# Patient Record
Sex: MALE | Race: BLACK OR AFRICAN AMERICAN
[De-identification: names, ages, dates, MRNs, and addresses within clinical notes are randomized per-mention and may not be internally consistent; named-entity substitution may affect disease eponyms.]

---

## 2020-11-23 ENCOUNTER — HOSPITAL ENCOUNTER (INPATIENT)
Dept: HOSPITAL 35 - ER | Age: 70
LOS: 10 days | Discharge: NURSING, REHAB OR LONG-TERM CARE | DRG: 871 | End: 2020-12-03
Attending: HOSPITALIST | Admitting: HOSPITALIST
Payer: COMMERCIAL

## 2020-11-23 VITALS — DIASTOLIC BLOOD PRESSURE: 47 MMHG | SYSTOLIC BLOOD PRESSURE: 103 MMHG

## 2020-11-23 VITALS — DIASTOLIC BLOOD PRESSURE: 47 MMHG | SYSTOLIC BLOOD PRESSURE: 73 MMHG

## 2020-11-23 VITALS — DIASTOLIC BLOOD PRESSURE: 51 MMHG | SYSTOLIC BLOOD PRESSURE: 117 MMHG

## 2020-11-23 VITALS — DIASTOLIC BLOOD PRESSURE: 37 MMHG | SYSTOLIC BLOOD PRESSURE: 122 MMHG

## 2020-11-23 VITALS — SYSTOLIC BLOOD PRESSURE: 98 MMHG | DIASTOLIC BLOOD PRESSURE: 44 MMHG

## 2020-11-23 VITALS — SYSTOLIC BLOOD PRESSURE: 109 MMHG | DIASTOLIC BLOOD PRESSURE: 49 MMHG

## 2020-11-23 VITALS — DIASTOLIC BLOOD PRESSURE: 46 MMHG | SYSTOLIC BLOOD PRESSURE: 67 MMHG

## 2020-11-23 VITALS — SYSTOLIC BLOOD PRESSURE: 104 MMHG | DIASTOLIC BLOOD PRESSURE: 48 MMHG

## 2020-11-23 VITALS — DIASTOLIC BLOOD PRESSURE: 46 MMHG | SYSTOLIC BLOOD PRESSURE: 121 MMHG

## 2020-11-23 VITALS — SYSTOLIC BLOOD PRESSURE: 74 MMHG | DIASTOLIC BLOOD PRESSURE: 45 MMHG

## 2020-11-23 VITALS — HEIGHT: 67.01 IN | WEIGHT: 129.31 LBS | BODY MASS INDEX: 20.29 KG/M2

## 2020-11-23 VITALS — SYSTOLIC BLOOD PRESSURE: 103 MMHG | DIASTOLIC BLOOD PRESSURE: 59 MMHG

## 2020-11-23 DIAGNOSIS — E43: ICD-10-CM

## 2020-11-23 DIAGNOSIS — G93.41: ICD-10-CM

## 2020-11-23 DIAGNOSIS — F03.90: ICD-10-CM

## 2020-11-23 DIAGNOSIS — A41.9: Primary | ICD-10-CM

## 2020-11-23 DIAGNOSIS — F32.9: ICD-10-CM

## 2020-11-23 DIAGNOSIS — K59.00: ICD-10-CM

## 2020-11-23 DIAGNOSIS — I12.9: ICD-10-CM

## 2020-11-23 DIAGNOSIS — Z20.828: ICD-10-CM

## 2020-11-23 DIAGNOSIS — R41.0: ICD-10-CM

## 2020-11-23 DIAGNOSIS — E78.5: ICD-10-CM

## 2020-11-23 DIAGNOSIS — R13.10: ICD-10-CM

## 2020-11-23 DIAGNOSIS — I69.359: ICD-10-CM

## 2020-11-23 DIAGNOSIS — R65.21: ICD-10-CM

## 2020-11-23 DIAGNOSIS — K92.0: ICD-10-CM

## 2020-11-23 DIAGNOSIS — K22.11: ICD-10-CM

## 2020-11-23 DIAGNOSIS — Z66: ICD-10-CM

## 2020-11-23 DIAGNOSIS — K25.4: ICD-10-CM

## 2020-11-23 DIAGNOSIS — E11.22: ICD-10-CM

## 2020-11-23 DIAGNOSIS — D50.9: ICD-10-CM

## 2020-11-23 DIAGNOSIS — N17.0: ICD-10-CM

## 2020-11-23 DIAGNOSIS — E87.6: ICD-10-CM

## 2020-11-23 DIAGNOSIS — I95.9: ICD-10-CM

## 2020-11-23 DIAGNOSIS — N18.30: ICD-10-CM

## 2020-11-23 DIAGNOSIS — K44.9: ICD-10-CM

## 2020-11-23 DIAGNOSIS — Z79.899: ICD-10-CM

## 2020-11-23 DIAGNOSIS — E86.0: ICD-10-CM

## 2020-11-23 DIAGNOSIS — Z88.8: ICD-10-CM

## 2020-11-23 DIAGNOSIS — E83.42: ICD-10-CM

## 2020-11-23 LAB
ALBUMIN SERPL-MCNC: 4 G/DL (ref 3.4–5)
ALT SERPL-CCNC: 15 U/L (ref 30–65)
ANION GAP SERPL CALC-SCNC: 36 MMOL/L (ref 7–16)
ANION GAP SERPL CALC-SCNC: 40 MMOL/L (ref 7–16)
AST SERPL-CCNC: 8 U/L (ref 15–37)
BACTERIA-REFLEX: (no result) /HPF
BILIRUB SERPL-MCNC: 0.3 MG/DL (ref 0.2–1)
BILIRUB UR-MCNC: NEGATIVE MG/DL
BUN SERPL-MCNC: 85 MG/DL (ref 7–18)
BUN SERPL-MCNC: 85 MG/DL (ref 7–18)
CALCIUM SERPL-MCNC: 8 MG/DL (ref 8.5–10.1)
CALCIUM SERPL-MCNC: 9.2 MG/DL (ref 8.5–10.1)
CHLORIDE SERPL-SCNC: 103 MMOL/L (ref 98–107)
CHLORIDE SERPL-SCNC: 99 MMOL/L (ref 98–107)
CO2 SERPL-SCNC: 6 MMOL/L (ref 21–32)
CO2 SERPL-SCNC: 7 MMOL/L (ref 21–32)
COLOR UR: YELLOW
CREAT SERPL-MCNC: 10.7 MG/DL (ref 0.7–1.3)
CREAT SERPL-MCNC: 10.8 MG/DL (ref 0.7–1.3)
ERYTHROCYTE [DISTWIDTH] IN BLOOD BY AUTOMATED COUNT: 15.2 % (ref 10.5–14.5)
GLUCOSE SERPL-MCNC: 113 MG/DL (ref 74–106)
GLUCOSE SERPL-MCNC: 142 MG/DL (ref 74–106)
GRANULOCYTES NFR BLD MANUAL: 85 % (ref 36–66)
HCT VFR BLD CALC: 32.8 % (ref 42–52)
HCT VFR BLD CALC: 42.2 % (ref 42–52)
HGB BLD-MCNC: 12.5 GM/DL (ref 14–18)
HGB BLD-MCNC: 9.7 GM/DL (ref 14–18)
KETONES UR STRIP-MCNC: (no result) MG/DL
LIPASE: 222 U/L (ref 73–393)
LYMPHOCYTES NFR BLD AUTO: 5 % (ref 24–44)
MAGNESIUM SERPL-MCNC: 1.9 MG/DL (ref 1.8–2.4)
MCH RBC QN AUTO: 23.2 PG (ref 26–34)
MCHC RBC AUTO-ENTMCNC: 29.5 G/DL (ref 28–37)
MCV RBC: 78.5 FL (ref 80–100)
MONOCYTES NFR BLD: 6 % (ref 1–8)
NEUTROPHILS # BLD: 11.9 THOU/UL (ref 1.4–8.2)
NEUTS BAND NFR BLD: 4 % (ref 0–8)
PLATELET # BLD: 254 THOU/UL (ref 150–400)
POTASSIUM SERPL-SCNC: 5.8 MMOL/L (ref 3.5–5.1)
POTASSIUM SERPL-SCNC: 5.9 MMOL/L (ref 3.5–5.1)
PROT SERPL-MCNC: 7.7 G/DL (ref 6.4–8.2)
RBC # BLD AUTO: 5.38 MIL/UL (ref 4.5–6)
RBC # UR STRIP: (no result) /UL
SODIUM SERPL-SCNC: 145 MMOL/L (ref 136–145)
SODIUM SERPL-SCNC: 146 MMOL/L (ref 136–145)
SP GR UR STRIP: >= 1.03 (ref 1–1.03)
SQUAMOUS: (no result) /LPF (ref 0–3)
TROPONIN I SERPL-MCNC: <0.06 NG/ML (ref ?–0.06)
URINE CLARITY: CLEAR
URINE GLUCOSE-RANDOM*: NEGATIVE
URINE LEUKOCYTES-REFLEX: (no result)
URINE NITRITE-REFLEX: NEGATIVE
URINE PROTEIN (DIPSTICK): (no result)
URINE WBC-REFLEX: (no result) /HPF (ref 0–5)
UROBILINOGEN UR STRIP-ACNC: 0.2 E.U./DL (ref 0.2–1)
WBC # BLD AUTO: 13.4 THOU/UL (ref 4–11)

## 2020-11-23 PROCEDURE — 62110: CPT

## 2020-11-23 PROCEDURE — 10081 I&D PILONIDAL CYST COMP: CPT

## 2020-11-23 PROCEDURE — 10102: CPT

## 2020-11-23 PROCEDURE — 10078: CPT

## 2020-11-23 PROCEDURE — 62900: CPT

## 2020-11-23 PROCEDURE — 70005: CPT

## 2020-11-23 NOTE — NUR
LAB CALLED TO DRAW BLOOD AS UNABLE TO OBTAIN FROM IV SITE. LAB STATES
THEY ARE UNABLE TO COME AT THIS TIME BUT WILL COME TO DRAW BLOOD.

## 2020-11-23 NOTE — HC
[facility]
[address]
[place]   [address]                     CONSULTATION                  
_______________________________________________________________________________
 
Name:       EMILIE PARKER                   Room #:         212-P       ADM IN  
M.R.#:      [id number]                       Account #:      [id number]  
Admission:  11/23/20    Attend Phys:    Prabhu Benedict MD     
Discharge:              Date of Birth:  [birth year]  
                                                          Report #: [id number]
                                                                    [id number]   
_______________________________________________________________________________
THIS REPORT FOR:  
 
cc:  Sunitha Chou MD, Ramilo MD Al-Absi,Jo CID MD                                          ~
 
 
REASON FOR CONSULTATION:  Acute kidney injury.
 
REASON FOR PRESENTATION:  Brought from his nursing facility because of melena,
vomiting and possible hematemesis.
 
HISTORY OF PRESENT ILLNESS:  I am not able to obtain any meaningful history from
the patient.  He has acute mental status issues.  He is a poor historian.  He is
known to have CVA with hemiparesis.  We do not have any baseline on his chronic
kidney disease issues.  He has a history of dementia, diabetes mellitus, and
hyperlipidemia.  He was brought because of vomiting and possible hematemesis and
was found to be on a profound acute kidney injury with a creatinine value of
10.7 and severe metabolic acidosis with the gap of around 40.  The patient is
not currently able to provide me with any details; however, I talked with his
brother about his issues.  He was admitted to the ICU for further management of
his ongoing health issues.
 
MEDICATIONS:  I am unable to obtain any of his nursing home medication; however,
it is stated that the patient is diabetic and is taking metformin.
 
PAST MEDICAL HISTORY:  Listed in the medical chart:
1.  CVA with hemiplegia and hemiparesis.
2.  Dementia.
3.  Diabetes mellitus.
4.  Hyperlipidemia.
5.  Hypertension.
6.  Dysarthria.
7.  Dysphagia.
 
ALLERGIES:  METOPROLOL AND NICARDIPINE.
 
REVIEW OF SYSTEMS:  I am not able to obtain given the patient's current mental
status.
 
FAMILY HISTORY:  I am not able to obtain given the patient's current mental
status.
 
SOCIAL HISTORY:  Unable to obtain, but he came from a nursing facility.
 
PHYSICAL EXAMINATION:
 
 
 
[facility]
[address]
[place]   [address]                     CONSULTATION                  
_______________________________________________________________________________
 
Name:       EMILIE PARKER                   Room #:         212-P       [facility] [place]  
[facility].#:      [id number]                       Account #:      [id number]  
Admission:  11/23/20    Attend Phys:    Prabhu Benedict MD     
Discharge:              Date of Birth:  [birth year]  
                                                          Report #: [id number]
                                                                    [id number]   
_______________________________________________________________________________
 
 
VITAL SIGNS:  Temperature 37.4, blood pressure is 120/85, pulse rate is 102.
HEAD AND NECK:  Extremely dry mucous membrane.
CHEST:  No crackles.
CARDIOVASCULAR:  No rub detected.
ABDOMEN:  Soft.
EXTREMITIES:  Lower extremities, no edema.
 
LABORATORY VALUES:  From admission revealed a white blood cell count of 13.4, pH
of 7.185.  Sodium of 144, potassium of 3.3, BUN of 71, creatinine of 8.0.
 
CT revealed a potential thickening of the left lateral and anterior wall of the
rectum.
 
Renal ultrasound revealed no evidence of hydronephrosis.
 
ASSESSMENT
1.  Acute kidney injury.
2.  Severe metabolic acidosis.
3.  Diabetes mellitus.
4.  Hypertension.
5.  Dementia.
 
PLAN:  I had a lengthy discussion with the patient's brother yesterday.  No
dialysis is warranted by the family members.  His metabolic acidosis is likely
related to multifactorial issues including metformin.  This has been improving. 
Continue with IV fluid for now.  His creatinine is dropping down.  Replace
potassium.  Continue to watch electrolytes, urine output.  Continue to avoid
nephrotoxins.  Continue with the current IV fluid; however, we will adjust in
the next few days to avoid electrolyte derangement.
 
 
 
 
 
 
 
 
 
 
 
 
 
                         
   By:                               
                   
D: 11/25/20 0744                           _____________________________________
T: 11/25/20 1111                           Jo Jones MD            /nt

## 2020-11-23 NOTE — EKG
[facility]
[place]   [address]                     ELECTROCARDIOGRAM REPORT      
_______________________________________________________________________________
 
Name:       EMILIE ARNOLD                   Room #:                     PRE [facility]..#:      [id number]                       Account #:      [id number]  
Admission:              Attend Phys:                          
Discharge:              Date of Birth:  [birth year]  
                                                          Report #: [id number]
                                                                    [id number]
_______________________________________________________________________________
THIS REPORT FOR:  
 
cc:                                
                                   
     Raulito Hart MD [facility]                                         ~
THIS REPORT FOR:   //name//                          
 
                         [facility] ED
                                       
Test Date:    2020               Test Time:    11:47:01
Pat Name:     EMILIE ARNOLD              Department:   
Patient ID:   [id number]            Room:          
Gender:                               Technician:   [facility]
:          1950               Requested By: Amparo Foote
Order Number: [id number] MD:   Raulito Hart
                                 Measurements
Intervals                              Axis          
Rate:         103                      P:            84
NC:           153                      QRS:          80
QRSD:         97                       T:            
QT:           346                                    
QTc:          453                                    
                           Interpretive Statements
Sinus tachycardia
Probable left atrial enlargement
Borderline repolarization abnormality
No previous ECG available for comparison
Electronically Signed On 2020 12:24:51 CST by Raulito Hart
[email or web address]
 
 
 
 
 
 
 
 
 
 
 
 
 
 
 
  <ELECTRONICALLY SIGNED>
   By: Raulito Hart MD, FAC    
  20     1224
D: 20 1147                           _____________________________________
T: 20 1147                           Raulito Hart MD, FACC      /EPI

## 2020-11-23 NOTE — NUR
LAB HAS NOT COME TO DRAW BLOOD. ATTEMPTED PERIPHERAL DRAW WITH NO SUCCESS.
LAB CALLED AGAIN FOR REQUEST.

## 2020-11-23 NOTE — EMS
[facility]
[address] [place]   [address]                     EMS Patient Care Report       
_______________________________________________________________________________
 
Name:       EMILIE PARKER                   Room #:                     REG SY CADET#:      [id number]                       Account #:      [id number]  
Admission:  20    Attend Phys:                          
Discharge:              Date of Birth:  [birth year]  
                                                          Report #: [id number]
                                                                    [id number]
_______________________________________________________________________________
THIS REPORT FOR:   //name//                          
 
Report Transmitted: 2020 12:46
EMS Care Summary
[place], [place]/[facility]
Incident [id number] @ 2020 10:45
 
Incident Location
[address]
 
Patient
EMILIE ARNOLD
Male, 70 Years
 1950
 
Patient Address
[address]
[place] [address]
 
Patient History
Stroke/CVA,
 
 
Patient Medications
Unknown,
 
Chief Complaint
VOMITING
 
Disposition
Transported No Lights/[place]
 
Dispatch Reason
Hemorrhage/Laceration
 
Transported To
[facility]
 
Narrative
RESPONDED TO HEMORRHAGE AT NURSING HOME. UPON ARRIVAL PT FOUND SLEEPING IN BED. 
[facility] STAFF REPORT THEY ARE NEW TO FACILITY AND DO NOT KNOW HOW LONG THIS ISSUE 
HAS BEEN HAPPENING. PT IS ALERT AND ORIENTED, REPORTS ISSUE FOR 3 DAYS. PT HAS 
NO OTHER COMPLAINTS EXCEPT THE VOMITING. [facility] STAFF REPORTS THE VOMIT APPEARS TO 
HAVE TRACE AMOUNTS OF BLOOD AND APPEARS TO BE FECES. [facility] STAFF CANNOT GIVE INFO 
 
 
 
[facility]
[address] [place]   [address]                     EMS Patient Care Report       
_______________________________________________________________________________
 
Name:       EMILIE PARKER                   Room #:                     ERIC CADET#:      [id number]                       Account #:      [id number]  
Admission:  20    Attend Phys:                          
Discharge:              Date of Birth:  [birth year]  
                                                          Report #: [id number]
                                                                    [id number]
_______________________________________________________________________________
ON IF PT HAS NORMAL STOOLS. PT IS TEAM LIFTED TO COT AND VITALS OBTAINED. EN 
ROUTE PT VOMITS X1. VOMIT APPEARS TO BE DARK BROWN, NO BLOOD NOTED. PT 
TRANSPORTED TO [facility] WITH NO CHANGE IN CONDITION. PT TEAM LIFTED TO BED AND 
HANDRAILS UP. REPORT GIVEN TO NURSE. 
 
Initial Vitals
@11:02P: 109,R: 20,BP: 99/52,SpO2: 96,
@10:57P: 59,R: 18,BP: 102/62,Pain: 0/10,GCS: 15,SpO2: 94,Revised Trauma: 12,
 
Assessments
@10:53MENTAL:Person Oriented,Time Oriented,Event Oriented,Place 
Oriented,SKIN:HEENT:Head/Face: No Abnormalities,Eyes: No 
Abnormalities,Neck/Airway: No Abnormalities,LUNG SOUNDS:General: 
Vomiting,ABDOMEN:General: Vomiting,PELVIS//GI:Incontinence,EXTREMITIES:Left 
Arm: Weakness,Left Leg: Weakness,Right Arm: Weakness,Right Leg: 
Weakness,PULSE:NEURO:No Abnormalities,@11:02MENTAL:No Abnormalities,SKIN:No 
Abnormalities,HEENT:Head/Face: No Abnormalities,Eyes: No 
Abnormalities,Neck/Airway: No Abnormalities,LUNG SOUNDS:General: Vomiting,Left 
Upper: No Abnormalities,Right Upper: No Abnormalities,Left Lower: No 
Abnormalities,Right Lower: No Abnormalities,ABDOMEN:General: Vomiting,Left 
Upper: No Abnormalities,Right Upper: No Abnormalities,Left Lower: No 
Abnormalities,Right Lower: No 
Abnormalities,PELVIS//GI:Incontinence,EXTREMITIES:Left Arm: Weakness,Right 
Arm: Weakness,Left Leg: Weakness,Right Leg: Weakness,PULSE:NEURO:No 
Abnormalities, 
 
Impression
Vomiting
 
Procedures
@10:53ALS AssessmentResponse: UnchangedSucceeded
 
Timeline
10:43,Call Received
10:43,Dispatch Notified
10:45,Dispatched
10:46,En Route
10:48,On Scene
10:53,At Patient
10:53,ALS Assessment,Response: UnchangedSucceeded,
10:57,BP: 102/62 M,PULSE: 59,RR: 18 R,SPO2: 94 Ox,ETCO2:  ,BG: ,PAIN: 0,GCS: 15,
11:02,BP: 99/52 M,PULSE: 109,RR: 20 R,SPO2: 96 Ox,ETCO2:  ,BG: ,PAIN: ,GCS: ,
11:02,Depart Scene
11:04,At Destination
11:15,Call Closed
 
 
 
 
[facility]
[address]
[place]   [address]                     EMS Patient Care Report       
_______________________________________________________________________________
 
Name:       EMILIE PARKER                   [facility] #[facility]                     [id number]                       Account #:      [id number]  
Admission:  20    Attend Phys:                          
Discharge:              Date of Birth:  [birth year]  
                                                          Report #: [id number]
                                                                    [id number]
_______________________________________________________________________________
Disclaimer
v1.1     Copyright 2020 [employer]
This EMS Care Summary contains data elements from the applicable legal record 
(which may be displayed differently). It is designed to provide pertinent 
information for the following purposes: continuity of care, clinical quality, 
and state data reporting. The complete legal record is available to ED staff 
and administrators of the receiving hospital in [facility]'s Patient Tracker. All data 
is provided "as is."

## 2020-11-24 VITALS — DIASTOLIC BLOOD PRESSURE: 33 MMHG | SYSTOLIC BLOOD PRESSURE: 131 MMHG

## 2020-11-24 VITALS — SYSTOLIC BLOOD PRESSURE: 119 MMHG | DIASTOLIC BLOOD PRESSURE: 50 MMHG

## 2020-11-24 VITALS — DIASTOLIC BLOOD PRESSURE: 63 MMHG | SYSTOLIC BLOOD PRESSURE: 105 MMHG

## 2020-11-24 VITALS — DIASTOLIC BLOOD PRESSURE: 47 MMHG | SYSTOLIC BLOOD PRESSURE: 149 MMHG

## 2020-11-24 VITALS — SYSTOLIC BLOOD PRESSURE: 135 MMHG | DIASTOLIC BLOOD PRESSURE: 46 MMHG

## 2020-11-24 VITALS — SYSTOLIC BLOOD PRESSURE: 115 MMHG | DIASTOLIC BLOOD PRESSURE: 37 MMHG

## 2020-11-24 VITALS — SYSTOLIC BLOOD PRESSURE: 161 MMHG | DIASTOLIC BLOOD PRESSURE: 26 MMHG

## 2020-11-24 VITALS — SYSTOLIC BLOOD PRESSURE: 113 MMHG | DIASTOLIC BLOOD PRESSURE: 41 MMHG

## 2020-11-24 VITALS — DIASTOLIC BLOOD PRESSURE: 41 MMHG | SYSTOLIC BLOOD PRESSURE: 112 MMHG

## 2020-11-24 VITALS — DIASTOLIC BLOOD PRESSURE: 49 MMHG | SYSTOLIC BLOOD PRESSURE: 144 MMHG

## 2020-11-24 VITALS — DIASTOLIC BLOOD PRESSURE: 41 MMHG | SYSTOLIC BLOOD PRESSURE: 73 MMHG

## 2020-11-24 VITALS — SYSTOLIC BLOOD PRESSURE: 125 MMHG | DIASTOLIC BLOOD PRESSURE: 54 MMHG

## 2020-11-24 VITALS — SYSTOLIC BLOOD PRESSURE: 136 MMHG | DIASTOLIC BLOOD PRESSURE: 44 MMHG

## 2020-11-24 VITALS — SYSTOLIC BLOOD PRESSURE: 124 MMHG | DIASTOLIC BLOOD PRESSURE: 50 MMHG

## 2020-11-24 VITALS — DIASTOLIC BLOOD PRESSURE: 39 MMHG | SYSTOLIC BLOOD PRESSURE: 132 MMHG

## 2020-11-24 VITALS — DIASTOLIC BLOOD PRESSURE: 37 MMHG | SYSTOLIC BLOOD PRESSURE: 78 MMHG

## 2020-11-24 VITALS — SYSTOLIC BLOOD PRESSURE: 83 MMHG | DIASTOLIC BLOOD PRESSURE: 49 MMHG

## 2020-11-24 VITALS — SYSTOLIC BLOOD PRESSURE: 132 MMHG | DIASTOLIC BLOOD PRESSURE: 37 MMHG

## 2020-11-24 VITALS — SYSTOLIC BLOOD PRESSURE: 126 MMHG | DIASTOLIC BLOOD PRESSURE: 42 MMHG

## 2020-11-24 VITALS — SYSTOLIC BLOOD PRESSURE: 105 MMHG | DIASTOLIC BLOOD PRESSURE: 45 MMHG

## 2020-11-24 VITALS — DIASTOLIC BLOOD PRESSURE: 36 MMHG | SYSTOLIC BLOOD PRESSURE: 135 MMHG

## 2020-11-24 VITALS — DIASTOLIC BLOOD PRESSURE: 104 MMHG | SYSTOLIC BLOOD PRESSURE: 126 MMHG

## 2020-11-24 VITALS — SYSTOLIC BLOOD PRESSURE: 129 MMHG | DIASTOLIC BLOOD PRESSURE: 56 MMHG

## 2020-11-24 VITALS — SYSTOLIC BLOOD PRESSURE: 106 MMHG | DIASTOLIC BLOOD PRESSURE: 40 MMHG

## 2020-11-24 VITALS — DIASTOLIC BLOOD PRESSURE: 41 MMHG | SYSTOLIC BLOOD PRESSURE: 110 MMHG

## 2020-11-24 VITALS — DIASTOLIC BLOOD PRESSURE: 34 MMHG | SYSTOLIC BLOOD PRESSURE: 115 MMHG

## 2020-11-24 VITALS — SYSTOLIC BLOOD PRESSURE: 145 MMHG | DIASTOLIC BLOOD PRESSURE: 55 MMHG

## 2020-11-24 VITALS — SYSTOLIC BLOOD PRESSURE: 93 MMHG | DIASTOLIC BLOOD PRESSURE: 47 MMHG

## 2020-11-24 VITALS — SYSTOLIC BLOOD PRESSURE: 133 MMHG | DIASTOLIC BLOOD PRESSURE: 41 MMHG

## 2020-11-24 VITALS — SYSTOLIC BLOOD PRESSURE: 127 MMHG | DIASTOLIC BLOOD PRESSURE: 38 MMHG

## 2020-11-24 VITALS — DIASTOLIC BLOOD PRESSURE: 31 MMHG | SYSTOLIC BLOOD PRESSURE: 97 MMHG

## 2020-11-24 VITALS — DIASTOLIC BLOOD PRESSURE: 33 MMHG | SYSTOLIC BLOOD PRESSURE: 122 MMHG

## 2020-11-24 VITALS — SYSTOLIC BLOOD PRESSURE: 129 MMHG | DIASTOLIC BLOOD PRESSURE: 39 MMHG

## 2020-11-24 VITALS — DIASTOLIC BLOOD PRESSURE: 69 MMHG | SYSTOLIC BLOOD PRESSURE: 104 MMHG

## 2020-11-24 VITALS — DIASTOLIC BLOOD PRESSURE: 44 MMHG | SYSTOLIC BLOOD PRESSURE: 136 MMHG

## 2020-11-24 VITALS — SYSTOLIC BLOOD PRESSURE: 126 MMHG | DIASTOLIC BLOOD PRESSURE: 34 MMHG

## 2020-11-24 VITALS — SYSTOLIC BLOOD PRESSURE: 84 MMHG | DIASTOLIC BLOOD PRESSURE: 57 MMHG

## 2020-11-24 VITALS — SYSTOLIC BLOOD PRESSURE: 151 MMHG | DIASTOLIC BLOOD PRESSURE: 43 MMHG

## 2020-11-24 VITALS — SYSTOLIC BLOOD PRESSURE: 138 MMHG | DIASTOLIC BLOOD PRESSURE: 51 MMHG

## 2020-11-24 VITALS — DIASTOLIC BLOOD PRESSURE: 47 MMHG | SYSTOLIC BLOOD PRESSURE: 134 MMHG

## 2020-11-24 VITALS — DIASTOLIC BLOOD PRESSURE: 59 MMHG | SYSTOLIC BLOOD PRESSURE: 154 MMHG

## 2020-11-24 VITALS — SYSTOLIC BLOOD PRESSURE: 87 MMHG | DIASTOLIC BLOOD PRESSURE: 43 MMHG

## 2020-11-24 VITALS — DIASTOLIC BLOOD PRESSURE: 45 MMHG | SYSTOLIC BLOOD PRESSURE: 90 MMHG

## 2020-11-24 VITALS — DIASTOLIC BLOOD PRESSURE: 36 MMHG | SYSTOLIC BLOOD PRESSURE: 145 MMHG

## 2020-11-24 VITALS — DIASTOLIC BLOOD PRESSURE: 45 MMHG | SYSTOLIC BLOOD PRESSURE: 117 MMHG

## 2020-11-24 VITALS — SYSTOLIC BLOOD PRESSURE: 127 MMHG | DIASTOLIC BLOOD PRESSURE: 44 MMHG

## 2020-11-24 VITALS — DIASTOLIC BLOOD PRESSURE: 36 MMHG | SYSTOLIC BLOOD PRESSURE: 111 MMHG

## 2020-11-24 VITALS — SYSTOLIC BLOOD PRESSURE: 139 MMHG | DIASTOLIC BLOOD PRESSURE: 37 MMHG

## 2020-11-24 VITALS — SYSTOLIC BLOOD PRESSURE: 129 MMHG | DIASTOLIC BLOOD PRESSURE: 41 MMHG

## 2020-11-24 VITALS — SYSTOLIC BLOOD PRESSURE: 110 MMHG | DIASTOLIC BLOOD PRESSURE: 44 MMHG

## 2020-11-24 VITALS — SYSTOLIC BLOOD PRESSURE: 138 MMHG | DIASTOLIC BLOOD PRESSURE: 30 MMHG

## 2020-11-24 VITALS — DIASTOLIC BLOOD PRESSURE: 59 MMHG | SYSTOLIC BLOOD PRESSURE: 103 MMHG

## 2020-11-24 VITALS — DIASTOLIC BLOOD PRESSURE: 36 MMHG | SYSTOLIC BLOOD PRESSURE: 132 MMHG

## 2020-11-24 VITALS — DIASTOLIC BLOOD PRESSURE: 47 MMHG | SYSTOLIC BLOOD PRESSURE: 117 MMHG

## 2020-11-24 VITALS — SYSTOLIC BLOOD PRESSURE: 93 MMHG | DIASTOLIC BLOOD PRESSURE: 48 MMHG

## 2020-11-24 VITALS — DIASTOLIC BLOOD PRESSURE: 44 MMHG | SYSTOLIC BLOOD PRESSURE: 123 MMHG

## 2020-11-24 VITALS — SYSTOLIC BLOOD PRESSURE: 120 MMHG | DIASTOLIC BLOOD PRESSURE: 63 MMHG

## 2020-11-24 VITALS — DIASTOLIC BLOOD PRESSURE: 33 MMHG | SYSTOLIC BLOOD PRESSURE: 114 MMHG

## 2020-11-24 VITALS — SYSTOLIC BLOOD PRESSURE: 140 MMHG | DIASTOLIC BLOOD PRESSURE: 35 MMHG

## 2020-11-24 VITALS — SYSTOLIC BLOOD PRESSURE: 138 MMHG | DIASTOLIC BLOOD PRESSURE: 56 MMHG

## 2020-11-24 VITALS — DIASTOLIC BLOOD PRESSURE: 53 MMHG | SYSTOLIC BLOOD PRESSURE: 89 MMHG

## 2020-11-24 VITALS — DIASTOLIC BLOOD PRESSURE: 57 MMHG | SYSTOLIC BLOOD PRESSURE: 116 MMHG

## 2020-11-24 VITALS — SYSTOLIC BLOOD PRESSURE: 105 MMHG | DIASTOLIC BLOOD PRESSURE: 32 MMHG

## 2020-11-24 VITALS — SYSTOLIC BLOOD PRESSURE: 112 MMHG | DIASTOLIC BLOOD PRESSURE: 41 MMHG

## 2020-11-24 VITALS — DIASTOLIC BLOOD PRESSURE: 47 MMHG | SYSTOLIC BLOOD PRESSURE: 127 MMHG

## 2020-11-24 VITALS — SYSTOLIC BLOOD PRESSURE: 138 MMHG | DIASTOLIC BLOOD PRESSURE: 38 MMHG

## 2020-11-24 VITALS — SYSTOLIC BLOOD PRESSURE: 112 MMHG | DIASTOLIC BLOOD PRESSURE: 49 MMHG

## 2020-11-24 VITALS — SYSTOLIC BLOOD PRESSURE: 113 MMHG | DIASTOLIC BLOOD PRESSURE: 49 MMHG

## 2020-11-24 VITALS — DIASTOLIC BLOOD PRESSURE: 46 MMHG | SYSTOLIC BLOOD PRESSURE: 106 MMHG

## 2020-11-24 VITALS — DIASTOLIC BLOOD PRESSURE: 47 MMHG | SYSTOLIC BLOOD PRESSURE: 74 MMHG

## 2020-11-24 VITALS — DIASTOLIC BLOOD PRESSURE: 49 MMHG | SYSTOLIC BLOOD PRESSURE: 118 MMHG

## 2020-11-24 VITALS — DIASTOLIC BLOOD PRESSURE: 48 MMHG | SYSTOLIC BLOOD PRESSURE: 126 MMHG

## 2020-11-24 VITALS — DIASTOLIC BLOOD PRESSURE: 29 MMHG | SYSTOLIC BLOOD PRESSURE: 135 MMHG

## 2020-11-24 VITALS — SYSTOLIC BLOOD PRESSURE: 106 MMHG | DIASTOLIC BLOOD PRESSURE: 86 MMHG

## 2020-11-24 VITALS — DIASTOLIC BLOOD PRESSURE: 43 MMHG | SYSTOLIC BLOOD PRESSURE: 132 MMHG

## 2020-11-24 VITALS — DIASTOLIC BLOOD PRESSURE: 38 MMHG | SYSTOLIC BLOOD PRESSURE: 110 MMHG

## 2020-11-24 VITALS — DIASTOLIC BLOOD PRESSURE: 63 MMHG | SYSTOLIC BLOOD PRESSURE: 135 MMHG

## 2020-11-24 VITALS — SYSTOLIC BLOOD PRESSURE: 96 MMHG | DIASTOLIC BLOOD PRESSURE: 50 MMHG

## 2020-11-24 VITALS — DIASTOLIC BLOOD PRESSURE: 40 MMHG | SYSTOLIC BLOOD PRESSURE: 141 MMHG

## 2020-11-24 VITALS — DIASTOLIC BLOOD PRESSURE: 103 MMHG | SYSTOLIC BLOOD PRESSURE: 135 MMHG

## 2020-11-24 VITALS — DIASTOLIC BLOOD PRESSURE: 53 MMHG | SYSTOLIC BLOOD PRESSURE: 116 MMHG

## 2020-11-24 VITALS — DIASTOLIC BLOOD PRESSURE: 35 MMHG | SYSTOLIC BLOOD PRESSURE: 170 MMHG

## 2020-11-24 VITALS — SYSTOLIC BLOOD PRESSURE: 101 MMHG | DIASTOLIC BLOOD PRESSURE: 43 MMHG

## 2020-11-24 VITALS — DIASTOLIC BLOOD PRESSURE: 50 MMHG | SYSTOLIC BLOOD PRESSURE: 123 MMHG

## 2020-11-24 VITALS — SYSTOLIC BLOOD PRESSURE: 127 MMHG | DIASTOLIC BLOOD PRESSURE: 43 MMHG

## 2020-11-24 VITALS — SYSTOLIC BLOOD PRESSURE: 157 MMHG | DIASTOLIC BLOOD PRESSURE: 31 MMHG

## 2020-11-24 VITALS — SYSTOLIC BLOOD PRESSURE: 118 MMHG | DIASTOLIC BLOOD PRESSURE: 36 MMHG

## 2020-11-24 VITALS — DIASTOLIC BLOOD PRESSURE: 51 MMHG | SYSTOLIC BLOOD PRESSURE: 135 MMHG

## 2020-11-24 VITALS — DIASTOLIC BLOOD PRESSURE: 40 MMHG | SYSTOLIC BLOOD PRESSURE: 122 MMHG

## 2020-11-24 LAB
ALBUMIN SERPL-MCNC: 2.9 G/DL (ref 3.4–5)
ANION GAP SERPL CALC-SCNC: 36 MMOL/L (ref 7–16)
BACTERIA #/AREA URNS HPF: (no result) /HPF
BASOPHILS NFR BLD AUTO: 0 % (ref 0–2)
BE(VIVO): -19.8 MMOL/L
BILIRUB UR-MCNC: NEGATIVE MG/DL
BUN SERPL-MCNC: 80 MG/DL (ref 7–18)
CALCIUM SERPL-MCNC: 7.4 MG/DL (ref 8.5–10.1)
CHLORIDE SERPL-SCNC: 105 MMOL/L (ref 98–107)
CO2 SERPL-SCNC: 8 MMOL/L (ref 21–32)
COLOR UR: YELLOW
CREAT SERPL-MCNC: 10.4 MG/DL (ref 0.7–1.3)
EOSINOPHIL NFR BLD: 0 % (ref 0–3)
ERYTHROCYTE [DISTWIDTH] IN BLOOD BY AUTOMATED COUNT: 15.5 % (ref 10.5–14.5)
GLUCOSE SERPL-MCNC: 238 MG/DL (ref 74–106)
GRANULOCYTES NFR BLD MANUAL: 84.1 % (ref 36–66)
HCO3 BLD-SCNC: 6.4 MMOL/L (ref 22–26)
HCT VFR BLD CALC: 34.4 % (ref 42–52)
HGB BLD-MCNC: 10.1 GM/DL (ref 14–18)
KETONES UR STRIP-MCNC: (no result) MG/DL
LYMPHOCYTES NFR BLD AUTO: 5.1 % (ref 24–44)
MAGNESIUM SERPL-MCNC: 1.6 MG/DL (ref 1.8–2.4)
MCH RBC QN AUTO: 23 PG (ref 26–34)
MCHC RBC AUTO-ENTMCNC: 29.2 G/DL (ref 28–37)
MCV RBC: 78.7 FL (ref 80–100)
MONOCYTES NFR BLD: 10.8 % (ref 1–8)
NEUTROPHILS # BLD: 9.1 THOU/UL (ref 1.4–8.2)
NITRITE UR QL STRIP: NEGATIVE
PCO2 BLD: 17.4 MMHG (ref 35–45)
PHOSPHATE SERPL-MCNC: 7.4 MG/DL (ref 2.5–4.9)
PLATELET # BLD: 216 THOU/UL (ref 150–400)
PO2 BLD: 109.5 MMHG (ref 80–100)
POTASSIUM SERPL-SCNC: 4.5 MMOL/L (ref 3.5–5.1)
RBC # BLD AUTO: 4.37 MIL/UL (ref 4.5–6)
RBC # UR STRIP: (no result) /UL
SODIUM SERPL-SCNC: 149 MMOL/L (ref 136–145)
SP GR UR STRIP: 1.02 (ref 1–1.03)
SQUAMOUS: (no result) /LPF (ref 0–3)
URINE CLARITY: (no result)
URINE CREATININE-RANDOM*: 78.5 MG/DL
URINE GLUCOSE-RANDOM*: NEGATIVE
URINE LEUKOCYTES: (no result)
URINE PROTEIN (DIPSTICK): (no result)
URINE PROTEIN-RANDOM*: 195.2 MG/DL (ref ?–11.9)
URINE SODIUM-RANDOM*: 101 MMOL/L
UROBILINOGEN UR STRIP-ACNC: 0.2 E.U./DL (ref 0.2–1)
WBC # BLD AUTO: 10.9 THOU/UL (ref 4–11)
WBC #/AREA URNS HPF: (no result) /HPF (ref 0–5)

## 2020-11-24 NOTE — NUR
chart review. unable to visit with storm. noted in chart he from [facility]. cm called number listed for brother betsy is wrong number. cm
called meka and unable to speak with nurse.

## 2020-11-24 NOTE — NUR
PATIENT CAME UP TO THE FLOOR AT AROUND 2230. THE ER NURSE AND I CLEANED THE
PATIENT UP AND GOT HIM INTO BED. PATIENT IS CONFUSED BUT CAN ANSWER SIMPLE
QUESTIONS. PATIENT WAS HYPOTENSIVE AND I CONTACTED ONUR LONDON. I GAVE THE
PATIENT A LITER OF NS. PATIENT'S BP CAME UP, BUT BECAME TACHYPNIC. CONTACTED
ONUR LONDON AGAIN AND RECIEVED ORDERS FOR AN AMP OF BICARB AND AN ABG. BICARB
GIVEN AND PATIENT'S RESPIRATIONS RETURNED TO NORMAL. ABG SHOWED CRIT LOW
BICARB AND PH. PATIENT'S LACTATE CONTINUES TO CLIMB. ANOTHER LACTATE WAS DRAWN
AT 0320. LAB HAS BEEN CONTACTED 3 DIFFERENT
TIMES ASKING ABOUT THE PATIENT'S LACTATE
LEVELS AT THIS TIME.

## 2020-11-24 NOTE — NUR
0815 PT RESTLESS PULLING AT SUAREZ OBTAINED ORDERS FOR RESTRAINTS PER ESTUARDO. PT
CODE STATUS CHANGED THIS AM. 1000 SPOKE W/DR MARTE REGARDING PT PLAN OF CARE.
LACTIC ACID DRAWS NO LONGER NEEDED Q3 PER KARU. 1030 PT MADE NPO FOLLOWING
SWALLOW EVAL. TITRATED OFF LEVO AT 1330. AFEBRILE. NO BM. . PROGRESSING
IN PLAN OF CARE

## 2020-11-25 VITALS — SYSTOLIC BLOOD PRESSURE: 124 MMHG | DIASTOLIC BLOOD PRESSURE: 61 MMHG

## 2020-11-25 VITALS — SYSTOLIC BLOOD PRESSURE: 124 MMHG | DIASTOLIC BLOOD PRESSURE: 64 MMHG

## 2020-11-25 VITALS — SYSTOLIC BLOOD PRESSURE: 136 MMHG | DIASTOLIC BLOOD PRESSURE: 77 MMHG

## 2020-11-25 VITALS — DIASTOLIC BLOOD PRESSURE: 85 MMHG | SYSTOLIC BLOOD PRESSURE: 120 MMHG

## 2020-11-25 VITALS — DIASTOLIC BLOOD PRESSURE: 59 MMHG | SYSTOLIC BLOOD PRESSURE: 148 MMHG

## 2020-11-25 LAB
ANION GAP SERPL CALC-SCNC: 12 MMOL/L (ref 7–16)
BUN SERPL-MCNC: 71 MG/DL (ref 7–18)
CALCIUM SERPL-MCNC: 8 MG/DL (ref 8.5–10.1)
CHLORIDE SERPL-SCNC: 104 MMOL/L (ref 98–107)
CO2 SERPL-SCNC: 28 MMOL/L (ref 21–32)
CREAT SERPL-MCNC: 8 MG/DL (ref 0.7–1.3)
ERYTHROCYTE [DISTWIDTH] IN BLOOD BY AUTOMATED COUNT: 14.2 % (ref 10.5–14.5)
GLUCOSE SERPL-MCNC: 204 MG/DL (ref 74–106)
HCT VFR BLD CALC: 31.1 % (ref 42–52)
HGB BLD-MCNC: 9.7 GM/DL (ref 14–18)
MCH RBC QN AUTO: 23.2 PG (ref 26–34)
MCHC RBC AUTO-ENTMCNC: 31.3 G/DL (ref 28–37)
MCV RBC: 74.3 FL (ref 80–100)
PLATELET # BLD: 155 THOU/UL (ref 150–400)
POTASSIUM SERPL-SCNC: 3.3 MMOL/L (ref 3.5–5.1)
RBC # BLD AUTO: 4.19 MIL/UL (ref 4.5–6)
SODIUM SERPL-SCNC: 144 MMOL/L (ref 136–145)
WBC # BLD AUTO: 6.1 THOU/UL (ref 4–11)

## 2020-11-25 NOTE — NUR
assumed pt care at around 2200, pt is a transfer from icu, pt is awake, alert
and oriented to self and place, sr on the monitor, asessments as charted, vss,
denied having concerns, no acute distress noted, will pass on report

## 2020-11-25 NOTE — NUR
FAXED CLINICAL UPDATE TO [facility] RECEIVED CONFIRMATION AND WILL F/U
WITH LEE IN ADM ON FRIDAY. The patient is a 31y Male complaining of abdominal pain.

## 2020-11-25 NOTE — 2DMMODE
[facility]
[address]
[place]   [address]                   2 D/M-MODE ECHOCARDIOGRAM     
_______________________________________________________________________________
 
Name:       EMILIE PARKER                   Room #:         212-P       ADM IN  
M.R.#:      [id number]                       Account #:      [id number]  
Admission:  20    Attend Phys:    Prabhu Benedict MD     
Discharge:              Date of Birth:  [birth year]  
                                                          Report #: [id number]
                                                                    [id number]
_______________________________________________________________________________
THIS REPORT FOR:  
 
cc:  Sunitha Chou MD, Ramilo MD Santiago, Patrick MD [facility]                                         
                                                                       ~
 
--------------- APPROVED REPORT --------------
 
 
Study performed:  2020 11:47:26
 
EXAM: Comprehensive 2D, Doppler, and color-flow 
Echocardiogram 
Patient Location: Bedside   
Room #:  212     Status:  routine
 
      BSA:         1.68
HR: 95 bpm BP:          124/61 mmHg 
Rhythm: NSR     
 
Other Information 
Study Quality: Technically Difficult
Technically limited study due to  inability to position patient, 
uncooperative patient.
 
Indications
CVA/TIA 
Diabetes
 
Echo Enhancing Agent
Indication: Rule out Shunt
Agent(s) / Amount(s) Used: Agitated Saline 7 cc
 
2D Dimensions
 IVC:  16.00 mm
 
Aortic Valve
AoV Peak Javier.:  1.49 m/s 
AO Peak Gr.:  8.85 mmHg  LVOT Max P.69 mmHg
    LVOT Max V:  1.29 m/s
 
Mitral Valve
    E/A Ratio:  0.9
    MV Decel. Time:  203.32 ms
MV E Max Javier.:  0.64 m/s 
 
 
[facility]
[address]
[place]  [address]
Phone:  [phone]                    2 D/M-MODE ECHOCARDIOGRAM     
_______________________________________________________________________________
 
Name:            EMILIE PARKER                   Room #:        212-P       ADM IN
M.R.#:           [id number]          Account #:     [id number]  
Admission:       20         Attend Phys:   Prabhu Benedict MD 
Discharge:                  Date of Birth: [birth year]  
                         Report #:      [id number]
        [id number]
_______________________________________________________________________________
MV A Javier.:  0.75 m/s  
MV PHT:  58.96 ms  
IVRT:  106.11 ms  
 
Pulmonary Valve
PV Peak Javier.:  1.01 m/s PV Peak Gr.:  4.10 mmHg
 
Pulmonary Vein
P Vein S:    0.30 m/s P Vein A:  0.28 m/s
P Vein D:   0.20 m/s P Vein A Dur.:  92.3 msec
P Vein S/D Ratio:  1.50 
 
Tricuspid Valve
TR Peak Javier.:  3.34 m/s  
TR Peak Gr.:  44.53 mmHg 
    PA Pressure:  50.00 mmHg
 
Left Ventricle
The left ventricle is normal size. There is normal LV segmental wall 
motion. There is normal left ventricular wall thickness. Left 
ventricular systolic function is normal. The left ventricular 
ejection fraction is within the normal range. LVEF is 60-65%. Grade I 
- abnormal relaxation pattern.
 
Right Ventricle
The right ventricle is normal size. The right ventricular systolic 
function is normal.
 
Atria
The left atrium size is normal. Interatrial septum is intact without 
evidence of ASD or PFO. The right atrium size is normal.
 
Aortic Valve
The aortic valve is normal in structure. No aortic regurgitation is 
present. There is no aortic valvular stenosis.
 
Mitral Valve
The mitral valve is normal in structure. There is no mitral valve 
regurgitation noted. No evidence of mitral valve stenosis.
 
Tricuspid Valve
The tricuspid valve is normal in structure. There is mild tricuspid 
regurgitation. Estimated PAP 50 mmHg. There is moderate pulmonary 
hypertension.
 
Pulmonic Valve
 
 
[facility]
[address]
[place]  [address]
Phone:  [phone]                    2 D/M-MODE ECHOCARDIOGRAM     
_______________________________________________________________________________
 
Name:            EMILIE PARKER                   Room #:        212-P       [facility] [place]
..#:           [id number]          Account #:     [id number]  
Admission:       20         Attend Phys:   Prabhu Benedict MD 
Discharge:                  Date of Birth: [birth year]  
                         Report #:      [id number]
        [id number]
_______________________________________________________________________________
The pulmonary valve is normal in structure. There is no pulmonic 
valvular regurgitation.
 
Great Vessels
The aortic root is normal in size. IVC is normal in size and 
collapses >50% with inspiration.
 
Pericardium
There is no pericardial effusion.
 
<Conclusion>
Normal left ventricle size and wall thickness
Ejection fraction 60%
Normal right ventricular size and function
Normal aortic/mitral valve structure and function
Mild tricuspid valve insufficiency
Pulmonary artery systolic pressure estimated at 50 mmHg
No pericardial effusion
 
 
 
 
 
 
 
 
 
 
 
 
 
 
 
 
 
 
 
 
 
 
 
 
 
 
  <ELECTRONICALLY SIGNED>
   By: Raulito Hart MD, [facility]    
  20     1300
D: 20 1300                           _____________________________________
T: 20 [facility]                           Raulito Hart MD, FACC      /INF

## 2020-11-25 NOTE — NUR
Patient admits from [facility] where he resides in ltc. Sp with
sibling Alexander Seth. Discussed can visit with one designated visitor and
reviewed hours. Mr Seth unsure which sigling will be designated visitor. Sp
with admissions at [facility]. DC planner to update facility. Plan return to
[facility] once stable.

## 2020-11-26 VITALS — DIASTOLIC BLOOD PRESSURE: 58 MMHG | SYSTOLIC BLOOD PRESSURE: 147 MMHG

## 2020-11-26 VITALS — SYSTOLIC BLOOD PRESSURE: 118 MMHG | DIASTOLIC BLOOD PRESSURE: 53 MMHG

## 2020-11-26 VITALS — DIASTOLIC BLOOD PRESSURE: 58 MMHG | SYSTOLIC BLOOD PRESSURE: 98 MMHG

## 2020-11-26 VITALS — DIASTOLIC BLOOD PRESSURE: 58 MMHG | SYSTOLIC BLOOD PRESSURE: 111 MMHG

## 2020-11-26 VITALS — DIASTOLIC BLOOD PRESSURE: 61 MMHG | SYSTOLIC BLOOD PRESSURE: 117 MMHG

## 2020-11-26 LAB
ALBUMIN SERPL-MCNC: 2.4 G/DL (ref 3.4–5)
ALBUMIN SERPL-MCNC: 2.6 G/DL (ref 3.4–5)
ANION GAP SERPL CALC-SCNC: 10 MMOL/L (ref 7–16)
ANION GAP SERPL CALC-SCNC: 12 MMOL/L (ref 7–16)
BUN SERPL-MCNC: 42 MG/DL (ref 7–18)
BUN SERPL-MCNC: 54 MG/DL (ref 7–18)
CALCIUM SERPL-MCNC: 8 MG/DL (ref 8.5–10.1)
CALCIUM SERPL-MCNC: 8.3 MG/DL (ref 8.5–10.1)
CHLORIDE SERPL-SCNC: 104 MMOL/L (ref 98–107)
CHLORIDE SERPL-SCNC: 105 MMOL/L (ref 98–107)
CO2 SERPL-SCNC: 27 MMOL/L (ref 21–32)
CO2 SERPL-SCNC: 27 MMOL/L (ref 21–32)
CREAT SERPL-MCNC: 3.8 MG/DL (ref 0.7–1.3)
CREAT SERPL-MCNC: 5 MG/DL (ref 0.7–1.3)
GLUCOSE SERPL-MCNC: 156 MG/DL (ref 74–106)
GLUCOSE SERPL-MCNC: 196 MG/DL (ref 74–106)
PHOSPHATE SERPL-MCNC: 2.3 MG/DL (ref 2.5–4.9)
PHOSPHATE SERPL-MCNC: 2.5 MG/DL (ref 2.5–4.9)
POTASSIUM SERPL-SCNC: 2.6 MMOL/L (ref 3.5–5.1)
POTASSIUM SERPL-SCNC: 2.8 MMOL/L (ref 3.5–5.1)
SODIUM SERPL-SCNC: 142 MMOL/L (ref 136–145)
SODIUM SERPL-SCNC: 143 MMOL/L (ref 136–145)

## 2020-11-26 NOTE — NUR
08:00 PT IS RESPONDING MORE APPROPRIATLEY THIS AM. WILL TRY A RESTRAINT
RELEASE AND SEE HOW HE DOES WITH SUCH BEFORE OBTIANING ANOTHER NEW ORDER FOR
SUCH. TURNED TO RIGHT SIDE COCCYX IS SLIGHTLY RED THEREFORE RELIEVED AS MUCH
PRESSURE FROM AREA AS POSSIBLE. PARTIAL BED BATH PERFORMED, HAIR WASHED AND
LIPS SCRUBBED WELL FOR DRY SKIN REMOVAL. OINTMENT TO LIPS FOR SUCH. NSR NO
ECTOPY, DENIES ANY SOB BUT REQUESTING WATER, EXPLAINED TO HIM HE COULD NOT
DRINK WATER YET AS HE IS NOT SWALLOWING SAFELY.

## 2020-11-26 NOTE — NUR
PATIENT ON BILATERAL MITTENS.REPOSITIONED Q2 HOURS AND AS NEEDED.MONITOR SHOWS
SR.NPO.DENIES NEEDS AT THIS TIME.POC CONTINUED.

## 2020-11-26 NOTE — NUR
12:00 REPOITIONED PT. IV IS INFUSING TO RIGHT SUBCLAVIAN, BLOOD RETURN FROM
ALL THREE PORTS AND FLUSHED HEAVILY. NSR NO ECTOPY, NO SIGN'S OF SOB. SUAREZ
CLEAR YELLOW URINE, NO SEDIMENT. RENAL WILL BE MANAGING HIS ELECTROLYTE
REPLACEMTS MOVING FORWARD TODAY.

## 2020-11-27 VITALS — DIASTOLIC BLOOD PRESSURE: 64 MMHG | SYSTOLIC BLOOD PRESSURE: 153 MMHG

## 2020-11-27 VITALS — DIASTOLIC BLOOD PRESSURE: 48 MMHG | SYSTOLIC BLOOD PRESSURE: 104 MMHG

## 2020-11-27 VITALS — SYSTOLIC BLOOD PRESSURE: 120 MMHG | DIASTOLIC BLOOD PRESSURE: 68 MMHG

## 2020-11-27 VITALS — DIASTOLIC BLOOD PRESSURE: 54 MMHG | SYSTOLIC BLOOD PRESSURE: 89 MMHG

## 2020-11-27 VITALS — DIASTOLIC BLOOD PRESSURE: 56 MMHG | SYSTOLIC BLOOD PRESSURE: 143 MMHG

## 2020-11-27 LAB
ALBUMIN SERPL-MCNC: 2.4 G/DL (ref 3.4–5)
ANION GAP SERPL CALC-SCNC: 11 MMOL/L (ref 7–16)
BUN SERPL-MCNC: 33 MG/DL (ref 7–18)
CALCIUM SERPL-MCNC: 8 MG/DL (ref 8.5–10.1)
CHLORIDE SERPL-SCNC: 104 MMOL/L (ref 98–107)
CO2 SERPL-SCNC: 26 MMOL/L (ref 21–32)
CREAT SERPL-MCNC: 2.8 MG/DL (ref 0.7–1.3)
GLUCOSE SERPL-MCNC: 168 MG/DL (ref 74–106)
MAGNESIUM SERPL-MCNC: 1.2 MG/DL (ref 1.8–2.4)
PHOSPHATE SERPL-MCNC: 2.3 MG/DL (ref 2.5–4.9)
POTASSIUM SERPL-SCNC: 2.8 MMOL/L (ref 3.5–5.1)
SODIUM SERPL-SCNC: 141 MMOL/L (ref 136–145)

## 2020-11-27 NOTE — NUR
ASSUMED PT CARE AT THE CHANGE OF SHIFT, PT IS AWAKE, ALERT AND ORIENTED,
PLEASAMNT AND COOPERATIVE WITH CARE, PT IS OFF RESTRAINS, SR ON THE MONITOR,
ASSESSMENTS AS CHARTED, 2 BAGS OF POTASSIUM INFUSED, PT WANTS TO EAT, BUT NPO,
MIGHT NEED SPEECH EVAL, HAD A LARGE LOOSE STOOL, NO ACUTE DISTRESS NOTED, PLAN
IS TO MONITOR POTASSIUM, PROGRESSING WELL TOWARDS POC

## 2020-11-27 NOTE — NUR
ASSUMED CARE PT SHIFT CHANGE. ASSESSMETNS AS CHARTED.MEDS GIVEN PER MAR. PT
ALERT AND ORIENTRED X1-2. FORGETFUL AND CONFUSED AT TIMES. PT SEEN BY SPEECH
WITH DIET ORDER. TOLERATING FAIR. PT APPETITE LESS THAN ADEQUATE. SPEECH TO
HAVE VIDEO SWALLOW ON MONDAY. PT TURNED AS TOLERATED. POTASSIUM ANDMAG
REPLACED PER ORDERS. PT CURRENLTY RESTING IN BED IN NAP.WILL CONT TO MONITOR
AND FOLLOW POC. WILL PASS ON REPORT TO TARIQ RN.

## 2020-11-27 NOTE — NUR
Ro liason updated. No weekend dc anticipated. The pt has been placed on a
modified diet with supervision. Plans for video swallow Monday then likely
return to the nursing home. Case discussed with the care team.

## 2020-11-28 VITALS — DIASTOLIC BLOOD PRESSURE: 58 MMHG | SYSTOLIC BLOOD PRESSURE: 121 MMHG

## 2020-11-28 VITALS — DIASTOLIC BLOOD PRESSURE: 63 MMHG | SYSTOLIC BLOOD PRESSURE: 122 MMHG

## 2020-11-28 VITALS — SYSTOLIC BLOOD PRESSURE: 121 MMHG | DIASTOLIC BLOOD PRESSURE: 63 MMHG

## 2020-11-28 VITALS — SYSTOLIC BLOOD PRESSURE: 114 MMHG | DIASTOLIC BLOOD PRESSURE: 62 MMHG

## 2020-11-28 VITALS — SYSTOLIC BLOOD PRESSURE: 111 MMHG | DIASTOLIC BLOOD PRESSURE: 55 MMHG

## 2020-11-28 LAB
ANION GAP SERPL CALC-SCNC: 8 MMOL/L (ref 7–16)
BUN SERPL-MCNC: 20 MG/DL (ref 7–18)
CALCIUM SERPL-MCNC: 7.9 MG/DL (ref 8.5–10.1)
CHLORIDE SERPL-SCNC: 101 MMOL/L (ref 98–107)
CO2 SERPL-SCNC: 25 MMOL/L (ref 21–32)
CREAT SERPL-MCNC: 1.8 MG/DL (ref 0.7–1.3)
ERYTHROCYTE [DISTWIDTH] IN BLOOD BY AUTOMATED COUNT: 13.9 % (ref 10.5–14.5)
GLUCOSE SERPL-MCNC: 150 MG/DL (ref 74–106)
HCT VFR BLD CALC: 31.3 % (ref 42–52)
HGB BLD-MCNC: 9.4 GM/DL (ref 14–18)
MCH RBC QN AUTO: 22.8 PG (ref 26–34)
MCHC RBC AUTO-ENTMCNC: 30.1 G/DL (ref 28–37)
MCV RBC: 75.9 FL (ref 80–100)
PLATELET # BLD: 123 THOU/UL (ref 150–400)
POTASSIUM SERPL-SCNC: 2.9 MMOL/L (ref 3.5–5.1)
RBC # BLD AUTO: 4.12 MIL/UL (ref 4.5–6)
SODIUM SERPL-SCNC: 134 MMOL/L (ref 136–145)
WBC # BLD AUTO: 5.4 THOU/UL (ref 4–11)

## 2020-11-28 NOTE — NUR
1900 ASSUMED CARE OF PT, 1930 BASELINE ASSESSMENT COMPLETED, PT RESTING IN BED
AND ANSWERS SOME QUESTIONS WITH YES OR NO DENIES PAIN OR DISCOMFORT, POSITION
CHANGED, SCD'S AND FALL PRECAUTIONS IN PLACE

## 2020-11-28 NOTE — NUR
PATIENT SLEPT MOST OF THE NIGHT. PATIENT IS A&01-2, CONFUSED AT TIMES. FALL
PRECAUTIONS ARE IN PLACE. SR ON THE MONITOR. VSS. NO COMPLAINTS OF PAIN. NO
SIGNS OF SOA. CONTINUING TO ASSESS ACCORDING TO POC.

## 2020-11-28 NOTE — NUR
ORDER CLARIFICATION CALL BACK FROM
DR VALENCIA, HOLD POTASSIUM IV ORDER FOR NOW. WILL
REEVALUATE IN AM, READ BACK, PHARMACY NOTIFIED  BECCA NG RN

## 2020-11-28 NOTE — NUR
1930 notified KEYSHA GUERRERO OF POTASSIUM ORDERS FOR CLARIFICATION, WILL
DRAW LABS AS ORDERED THEN CALL NEPHROLOGY PRIOR TO GIVING ANY MORE POTASSIUM,
FOR ORDER CLARIFICATION.

## 2020-11-28 NOTE — NUR
Assumed care of pt. after transfer at 1340. Pt. is calm and cooperative.
Vitals stable, fall precautions in place.

## 2020-11-29 VITALS — DIASTOLIC BLOOD PRESSURE: 69 MMHG | SYSTOLIC BLOOD PRESSURE: 130 MMHG

## 2020-11-29 VITALS — SYSTOLIC BLOOD PRESSURE: 119 MMHG | DIASTOLIC BLOOD PRESSURE: 67 MMHG

## 2020-11-29 VITALS — SYSTOLIC BLOOD PRESSURE: 134 MMHG | DIASTOLIC BLOOD PRESSURE: 80 MMHG

## 2020-11-29 VITALS — DIASTOLIC BLOOD PRESSURE: 77 MMHG | SYSTOLIC BLOOD PRESSURE: 11 MMHG

## 2020-11-29 LAB
ALBUMIN SERPL-MCNC: 2.4 G/DL (ref 3.4–5)
ANION GAP SERPL CALC-SCNC: 9 MMOL/L (ref 7–16)
BUN SERPL-MCNC: 15 MG/DL (ref 7–18)
CALCIUM SERPL-MCNC: 8.2 MG/DL (ref 8.5–10.1)
CHLORIDE SERPL-SCNC: 104 MMOL/L (ref 98–107)
CO2 SERPL-SCNC: 23 MMOL/L (ref 21–32)
CREAT SERPL-MCNC: 1.4 MG/DL (ref 0.7–1.3)
GLUCOSE SERPL-MCNC: 131 MG/DL (ref 74–106)
MAGNESIUM SERPL-MCNC: 1.9 MG/DL (ref 1.8–2.4)
PHOSPHATE SERPL-MCNC: 1.7 MG/DL (ref 2.5–4.9)
POTASSIUM SERPL-SCNC: 3.4 MMOL/L (ref 3.5–5.1)
SODIUM SERPL-SCNC: 136 MMOL/L (ref 136–145)

## 2020-11-29 NOTE — NUR
pt has continued to be more alert and oriented during shift and is now
speaking in full sentences requesting something sweet
and holding own sugar free pudding while waiting in between
bites, oriented to person place and situation, disoriented to year.

## 2020-11-29 NOTE — NUR
ASSUMED PT CARE THIS AM. PT VSS, A&OX2. PT HAS NO COMPLAINTS OF PAIN. IV
PATENT, FLUIDS INFUSING. MEDS GIVEN PER EMAR WITHOUT COMPLAINT. PT BEING FED
BY STAFF FOLLOWING INSTRUCTIONS FROM SPEECH. HYDRATION ENCOURAGED WITH
THICKENED LIQUIDS. SUAREZ IN PLACE, PATENT. PT REFUSED BREAKFAST THIS AM.
ENCOURAGED TO EAT AT MEAL TIME. PT HAD A BM IN BED, COMPLETE BED CHANGE.

## 2020-11-30 VITALS — DIASTOLIC BLOOD PRESSURE: 74 MMHG | SYSTOLIC BLOOD PRESSURE: 175 MMHG

## 2020-11-30 VITALS — DIASTOLIC BLOOD PRESSURE: 75 MMHG | SYSTOLIC BLOOD PRESSURE: 139 MMHG

## 2020-11-30 VITALS — DIASTOLIC BLOOD PRESSURE: 75 MMHG | SYSTOLIC BLOOD PRESSURE: 143 MMHG

## 2020-11-30 VITALS — SYSTOLIC BLOOD PRESSURE: 138 MMHG | DIASTOLIC BLOOD PRESSURE: 80 MMHG

## 2020-11-30 LAB
ALBUMIN SERPL-MCNC: 2.5 G/DL (ref 3.4–5)
ANION GAP SERPL CALC-SCNC: 10 MMOL/L (ref 7–16)
BUN SERPL-MCNC: 12 MG/DL (ref 7–18)
CALCIUM SERPL-MCNC: 8.8 MG/DL (ref 8.5–10.1)
CHLORIDE SERPL-SCNC: 104 MMOL/L (ref 98–107)
CO2 SERPL-SCNC: 23 MMOL/L (ref 21–32)
CREAT SERPL-MCNC: 1.4 MG/DL (ref 0.7–1.3)
GLUCOSE SERPL-MCNC: 125 MG/DL (ref 74–106)
MAGNESIUM SERPL-MCNC: 1.4 MG/DL (ref 1.8–2.4)
PHOSPHATE SERPL-MCNC: 1.9 MG/DL (ref 2.5–4.9)
POTASSIUM SERPL-SCNC: 3.5 MMOL/L (ref 3.5–5.1)
SODIUM SERPL-SCNC: 137 MMOL/L (ref 136–145)

## 2020-11-30 NOTE — NUR
PT AOX2-3, TO PERSON, PLACE, AND INTERMITTENTLY TO SITUATION. PT NOTED TO BE
PLEASANTLY CONFUSED AND FORGETFUL, ABLE TO MAKE NEEDS KNOWN. PT DENIES PAIN
AND SOB WHILE ON ROOM AIR. PT WITHOUT PO INTAKE OF HONEY THICKENED FLUIDS AND
PUREED DIET THIS SHIFT. PT INCONTINENT OF BOWEL, DIARRHEA X1. SUAREZ PATENT. PT
RESTING IN BED THROUGHOUT SHIFT, FREQUENT REPOSITIONING ENCOURAGED, PT
REFUSING REPOSITIONING ASSISTANCE DUE TO REPORTS OF COMFORT WHILE ON LEFT
SIDE. REDNESS NOTED TO BOTTOM, ZGUARD APPLIED. ONCALL NP NOTIFIED, RECEIVED
ORDERS FOR LOW AIR LOSS MATTRESS. PT ENCOURAGED TO NOTIFY STAFF FOR ALL NEEDS,
CALL LIGHT WITHIN REACH, BED ALARM ON, BED IN LOWEST POSITION, FREQUENT
MONITORING WILL CONTINUE.

## 2020-11-30 NOTE — NUR
ON-GOING ASSESSMENT: CM REVIEWED CHART. CM FAXED UPDATED CLINICAL TO
[facility]. PT MAY NEED EGD/FLEX SIG. GI FOLLOWING AND WILL DISCUSS. CM
NOTIFIED BEDSIDE RN PT WILL NEED NEGATIVE COVID TEST PRIOR TO D/C BACK TO
FACILITY. CM WILL CONTINUE TO FOLLOW TO ASSIST AS NEEDED.

## 2020-11-30 NOTE — NUR
assumed care at 0700. pt is a&0 x2. pt is currently declining and denies all
medication when offer in the morning. pt is doing well with pt as he can side
and step. pillow is betweeen legs and abd is soft and flat. streeter is in place.
call light within reach. right jugular midline
is intact and shows no signs of redness or
swelling. pt denies pain or nausea or vomitting. will continue to monitor.
fall precaution.

## 2020-12-01 VITALS — DIASTOLIC BLOOD PRESSURE: 73 MMHG | SYSTOLIC BLOOD PRESSURE: 146 MMHG

## 2020-12-01 VITALS — SYSTOLIC BLOOD PRESSURE: 145 MMHG | DIASTOLIC BLOOD PRESSURE: 64 MMHG

## 2020-12-01 VITALS — DIASTOLIC BLOOD PRESSURE: 53 MMHG | SYSTOLIC BLOOD PRESSURE: 103 MMHG

## 2020-12-01 VITALS — DIASTOLIC BLOOD PRESSURE: 61 MMHG | SYSTOLIC BLOOD PRESSURE: 119 MMHG

## 2020-12-01 NOTE — NUR
ASSUMED CARE AT 0700. PT IS A&0 X2. PT IS ALERT TO SELF AND LOCATION. HE
DENIES ALL OF HIS MEDICATION. HE HAS A POOR DIET. VSS. PT NEEDS TOTAL CARE. PT
DENIES BREAKFAST. PT WILL BE NPO AT MIDNIGHT DUE TO GI PROCEDURE. PT HAS RIGHT
JUGULAR AND IT SHOWS NO SIGNS OF REDNESS OF SWELLING. PT HAS NO EDEMA. PT
DENIES ANY PAIN, SOA, N/V. PT HAS SUAREZ IN PLACE. PT IS INCONTINENT WITH BM.
PT IS ACHS BUT NOT GIVEN INSULIN DUE TO BLOOD GLUCOSE LEVEL.FALL PRECAUTION.
CALL LIGHT WITHIN REACH.

## 2020-12-01 NOTE — NUR
ASSUMED PT  CARE AT SHIFT CHANGE. PT REFUSED POLYETHYLENE GLYCOL. PT BLOOD
SUGAR  NO INSULIN INDICATED. PT DID TELL ME THAT HE DOES NOT WANT
ANYTHING AT ALL MEDICATION WISE BECAUSE HE DIDN'T KNOW WHAT IT WOULD DO FOR
HIM. PT WAS AGGITATED AT THE BEGINGING OF THE SHIFT BUT BECAME A LOT MORE
PLEASANT AS THE SHIFT PROGRESSED. PT DOES NOT LIKE THE NECTAR THICK FLUIDS BUT
I DID ENCOURAGE HIM TO DRINK PLENTY TO STAY HYDRATED. PT DENIES PAIN, N/V. PT
HAS A MIDLINE IN HIS RIGHT JUGULAR. PT DID NOT GET OUT OF BED THIS SHIFT. NO
SKIN ISSUES. OLD SCARS ON BOTTOM AND LEGS.  FREQUENT ROUNDS PERFORMED ON PT.
WILL CONTINUE TO MONITOR.

## 2020-12-01 NOTE — NUR
ASSUMED PT CARE AT SHIFT [facility]. PT HAS A SUAREZ IN PLACE. PT HAS TRIPLE LUMEN
RIGHT JUGULAR MIDLINE IN PLACE WITH FLUIDS RUNNING. PT OXYGEN IS AT A 91% I
TRIED TO ENCOURAGE PT TO WEAR 2L OXYGEN BUT HE REFUSED. GAVE REPORT TO KAIN
(PUSHPA) BEFORE GOING TO THE ER.

## 2020-12-02 VITALS — DIASTOLIC BLOOD PRESSURE: 67 MMHG | SYSTOLIC BLOOD PRESSURE: 151 MMHG

## 2020-12-02 VITALS — SYSTOLIC BLOOD PRESSURE: 142 MMHG | DIASTOLIC BLOOD PRESSURE: 59 MMHG

## 2020-12-02 VITALS — SYSTOLIC BLOOD PRESSURE: 158 MMHG | DIASTOLIC BLOOD PRESSURE: 69 MMHG

## 2020-12-02 LAB
ALBUMIN SERPL-MCNC: 2.6 G/DL (ref 3.4–5)
ALT SERPL-CCNC: 12 U/L (ref 30–65)
ANION GAP SERPL CALC-SCNC: 8 MMOL/L (ref 7–16)
AST SERPL-CCNC: 12 U/L (ref 15–37)
BILIRUB SERPL-MCNC: 0.2 MG/DL (ref 0.2–1)
BUN SERPL-MCNC: 7 MG/DL (ref 7–18)
CALCIUM SERPL-MCNC: 8.7 MG/DL (ref 8.5–10.1)
CHLORIDE SERPL-SCNC: 107 MMOL/L (ref 98–107)
CO2 SERPL-SCNC: 25 MMOL/L (ref 21–32)
CREAT SERPL-MCNC: 1.1 MG/DL (ref 0.7–1.3)
ERYTHROCYTE [DISTWIDTH] IN BLOOD BY AUTOMATED COUNT: 13.7 % (ref 10.5–14.5)
GLUCOSE SERPL-MCNC: 122 MG/DL (ref 74–106)
HCT VFR BLD CALC: 25.8 % (ref 42–52)
HGB BLD-MCNC: 8 GM/DL (ref 14–18)
IRON SERPL-MCNC: 39 UG/DL (ref 65–175)
MCH RBC QN AUTO: 23.4 PG (ref 26–34)
MCHC RBC AUTO-ENTMCNC: 30.9 G/DL (ref 28–37)
MCV RBC: 75.7 FL (ref 80–100)
OBSERVED RETIC COUNT: 0.38 % (ref 0.6–2.6)
PLATELET # BLD: 124 THOU/UL (ref 150–400)
POTASSIUM SERPL-SCNC: 3.3 MMOL/L (ref 3.5–5.1)
PROT SERPL-MCNC: 6.1 G/DL (ref 6.4–8.2)
RBC # BLD AUTO: 3.41 MIL/UL (ref 4.5–6)
SAO2 % BLD FROM PO2: 27 % (ref 20–39)
SODIUM SERPL-SCNC: 140 MMOL/L (ref 136–145)
TIBC SERPL-MCNC: 146 UG/DL (ref 250–450)
WBC # BLD AUTO: 4 THOU/UL (ref 4–11)

## 2020-12-02 NOTE — NUR
ON-GOING ASSESSMENT: CM REVIEWED CHART AND SPOKE WITH ATTENDING. PT GETTING
FLEX SIG TODAY. PT IS STILL OUT OF THE ROOM AT 1600 FOR PROCEDURE. CM SENT
UPDATED CLINICAL TO [facility] AND UPDATED YUNG IN ADMISSIONS. CM
WILL CONTINUE TO FOLLOW TO ASSIST AS NEEDED.

## 2020-12-02 NOTE — NUR
Assumed care of pt. at 0700. Pt. was calm and cooperative. Pt. was taken down
for EGD around 1400. Pt. returned from EGD and was pleasant and did not report
pain. Fall precautions in place.

## 2020-12-02 NOTE — NUR
CONTINUED CARE FROM OUTGOING RN. PT RESTING IN BED. NPO AT MIDNIGHT FOR EGD
TOMORROW. IV INTACT AND FLUIDS INFUSING. FOLLEY IN PLACE. FALL PREC MAINTAINED
AND WILL CONT WITH POC TILL EOS

## 2020-12-03 VITALS — SYSTOLIC BLOOD PRESSURE: 133 MMHG | DIASTOLIC BLOOD PRESSURE: 71 MMHG

## 2020-12-03 VITALS — SYSTOLIC BLOOD PRESSURE: 129 MMHG | DIASTOLIC BLOOD PRESSURE: 55 MMHG

## 2020-12-03 LAB
ANION GAP SERPL CALC-SCNC: 10 MMOL/L (ref 7–16)
BUN SERPL-MCNC: 7 MG/DL (ref 7–18)
CALCIUM SERPL-MCNC: 8.9 MG/DL (ref 8.5–10.1)
CHLORIDE SERPL-SCNC: 107 MMOL/L (ref 98–107)
CO2 SERPL-SCNC: 26 MMOL/L (ref 21–32)
CREAT SERPL-MCNC: 1.2 MG/DL (ref 0.7–1.3)
ERYTHROCYTE [DISTWIDTH] IN BLOOD BY AUTOMATED COUNT: 14 % (ref 10.5–14.5)
GLUCOSE SERPL-MCNC: 117 MG/DL (ref 74–106)
HCT VFR BLD CALC: 27.4 % (ref 42–52)
HGB BLD-MCNC: 8.4 GM/DL (ref 14–18)
MAGNESIUM SERPL-MCNC: 1.4 MG/DL (ref 1.8–2.4)
MCH RBC QN AUTO: 23.2 PG (ref 26–34)
MCHC RBC AUTO-ENTMCNC: 30.6 G/DL (ref 28–37)
MCV RBC: 76 FL (ref 80–100)
PLATELET # BLD: 145 THOU/UL (ref 150–400)
POTASSIUM SERPL-SCNC: 3.5 MMOL/L (ref 3.5–5.1)
RBC # BLD AUTO: 3.61 MIL/UL (ref 4.5–6)
SODIUM SERPL-SCNC: 143 MMOL/L (ref 136–145)
WBC # BLD AUTO: 4.4 THOU/UL (ref 4–11)

## 2020-12-03 NOTE — NUR
on-going assessment: CM REVIEWED CHART AND SPOKE WITH PTS BROTHER. PT IS
STABLE TO DISCHARGE TODAY TO [facility]. CM FAXED D/C ORDERS TO
FACILITY. CHART COPY WAS ORDERED AND UNIT SECRETARY NOTIFIED. BROTHER IS
AGREEABLE WITH DISCHARGE PLAN. TRANSPORTATION HAS BEEN ARRANGED FOR 1400. CM
NOTIFIED BEDSIDE RN WHO HAS THE NUMBER FOR REPORT. PT REPORTS NO FURTHER NEEDS
FROM CM .

## 2020-12-03 NOTE — NUR
ASSUMED CARE AT 0700. PT IS A&O X2. PT HAS A SUAREZ IN PLACE AND URINE IS LIGHT
YELLOW. PT DENIES ANY PAIN, N,V,D, SOA. PT BREATHING IS ADEQUATE IN THE 90'S
WITH RA. PT IS ABLE TO ROTATE AND TURN Q2. PT IS TOLERATING NECTAR THICK DIET
VERY WELL. PT IS STILL INCONTINENT WITH BOWEL MOVEMENT. WILL BE CHECK
FREQUENTLY. WOUND ON COCCYX AND Z GUARD WAS APPLIED ON COCCYX. THERE IS NO IV
AS THE PT REMOVED IV LAST NIGHT. DOCTOR SHIRA HAS OKAY PT WITH NO HAVING IV. PT
IS ACCU CHECK BUT THIS AM: PT BG IS WNL.FALL PRECAUTION. CALL LIGHT WITHIN
REACH. WILL CONTINUE TO MONITOR FOR BREATHING AND PAIN PRN..

## 2020-12-03 NOTE — NUR
PT CALLED FOR HELP.WHEN I GOT TO HIS ROOM, I NOTED THE CENTRAL LINE ON HIS BED
SIDE TABLE. NO ACTIVE BLEEDING NOTED TO NECK SITE. SOME SMALL AMOUNT OF BLOOD
NOTED ON GOWN. PT HAD NO CLUE WHAT I WAS TALKING ABOUT WHEN I ASKED WHY HE
PULLED THE LINE OUT.THE LINE WAS TRIMMED AT 25CM . PT DENIES PAIN. HE IS
CONVERSATIONAL. NO SOA NOTED.

## 2020-12-04 NOTE — P
[facility]
Cleve Agarwal
[place]   [address]                     PROCEDURE REPORT              
_______________________________________________________________________________
 
Name:       EMILIE PARKER                   Room #:         448-P       [facility] [place]  
M.R.#:      [id number]                       Account #:      [id number]  
Admission:  11/23/20    Attend Phys:    Prabhu Benedict MD     
Discharge:  12/03/20    Date of Birth:  [birth year]  
                                                          Report #: [id number]
                                                                    [id number]   
_______________________________________________________________________________
THIS REPORT FOR:  
 
cc:  Sunitha Chou MD, Ramilo MD McElhinney, Christian C. MD                                   ~
CC: Jo Chou
 
DATE OF SERVICE:  12/02/2020
 
 
PROCEDURE PERFORMED:  Flexible sigmoidoscopy. 
 
HISTORY OF PRESENT ILLNESS:  The patient is a 70-year-old male who was admitted
with nausea, vomiting, decreased bowel movements.  He has a history of dementia.
 Overall, is a poor historian, possible coffee-ground emesis.  The upper
endoscopy was just performed showing gastritis with esophagitis.  No evidence of
active bleeding; however, the patient has been on PPI therapy since admission. 
He underwent a CT scan of the abdomen and pelvis, which shows asymmetric
thickening in the left lateral and anterior wall of the rectum, moderate amount
of stool was noted.  Prostate enlargement and bladder wall thickening also
noted.  It is unclear when and if the patient has had a previous colonoscopy
because of his dementia, it was felt like he would not be able to tolerate a
prep, the plan is for flexible sigmoidoscopy. 
 
DESCRIPTION OF PROCEDURE:  The risks and benefits of the procedure were
explained to the patient's durable power of , those risks including but
not limited to bleeding, perforation and the risk of sedation.  He understood
these risks and gave informed consent.  Sedation was given using propofol per
anesthesia.  Next, a digital rectal exam was initially performed which was
normal.  I did not feel any mass lesions.  Next, the prostate feel enlarged, but
no obvious mass or nodule was noted.  Next, using a standard Olympus
colonoscope, the scope was placed in the patient's anus and advanced under
direct vision into the sigmoid colon.  The overall prep was very poor.  Despite
multiple washings and aspirations, I was not able to visualize the rectal
mucosa.  There were areas in the rectosigmoid and sigmoid colon that were able
to be visualized, which were normal.  No obvious colitis, polyps or masses were
seen, but again visualization was significantly limited.  Most of the rectum
could not be evaluated.  Again, the patient did not have a mass on digital
rectal exam or thickening felt on exam.  The scope was then withdrawn and the
procedure terminated.  The patient tolerated the procedure well. 
 
IMPRESSION: 
1.  Poor prep as described above. 
 
 
 
[address]   [address]                     PROCEDURE REPORT              
_______________________________________________________________________________
 
Name:       EMILIE PARKER                   Room #:         448-P       [facility] [place]  
M.R.#:      [id number]                       Account #:      [id number]  
Admission:  11/23/20    Attend Phys:    Prabhu Benedict MD     
Discharge:  12/03/20    Date of Birth:  [birth year]  
                                                          Report #: [id number]
                                                                    [id number]   
_______________________________________________________________________________
2.  No obvious mass palpated on digital rectal exam today.  No obvious mass
seen, but visualization was significantly limited. 
 
RECOMMENDATIONS:  Observe the patient post-procedure. 
 
Thank you for allowing me to participate in his care.
 
 
 
 
 
 
 
 
 
 
 
 
 
 
 
 
 
 
 
 
 
 
 
 
 
 
 
 
 
 
 
 
 
 
 
 
 
 
  <ELECTRONICALLY SIGNED>
   By: Mor Piña MD   
  12/04/20     1156
D: 12/02/20 1610                           _____________________________________
T: 12/03/20 0030                           Mor Piña MD     /nt

## 2020-12-04 NOTE — P
[facility]
Cleve Agarwal
[place]   [address]                     PROCEDURE REPORT              
_______________________________________________________________________________
 
Name:       EMILIE PARKER                   Room #:         448-P       [facility] [place]  
M.R.#:      [id number]                       Account #:      [id number]  
Admission:  11/23/20    Attend Phys:    Prabhu Benedict MD     
Discharge:  12/03/20    Date of Birth:  [birth year]  
                                                          Report #: [id number]
                                                                    [id number]   
_______________________________________________________________________________
THIS REPORT FOR:  
 
cc:  Sunitha Chou MD, Ramilo MD McElhinney, Christian C. MD                                   ~
CC: Jo Chou
 
DATE OF SERVICE:  12/02/2020
 
 
PROCEDURE PERFORMED:  Upper endoscopy with biopsies.
 
HISTORY OF PRESENT ILLNESS:  The patient is a 70-year-old male with possible
history of coffee-ground emesis.  He has been on PPI therapy since admission. 
Hemoglobin has remained stable.  A CT scan of the abdomen and pelvis was also
performed on 11/23/2020, which showed asymmetric thickening of the left lateral
and anterior wall of the rectum, may represent an infiltrating neoplasm or mass.
 Moderate amount of stool was present in the rectum.  Plan is for an EGD and
flexible sigmoidoscopy today.
 
DESCRIPTION OF PROCEDURE:  The risks and benefits of the procedure were
explained to the patient's DPOA, those risks including but not limited to
bleeding, perforation and the risk of sedation.  He understood these risks and
gave informed consent.  Sedation was given using propofol per anesthesia.  Next,
using a standard Olympus upper endoscope, the scope was placed in the patient's
mouth and advanced under direct vision through the esophagus, stomach and into
the second portion of the duodenum.  The upper and mid esophagus was normal in
appearance.  In the distal esophagus, grade B erosive esophagitis was noted.  No
evidence of active bleeding.  Upon entering the stomach, a small hiatal hernia
was noted.  There was a diffuse gastritis noted in the body and antrum of the
stomach with several linear erosions.  No evidence of bleeding.  Biopsies were
obtained to rule out H. pylori today.  The pylorus was normal and patent.  The
duodenal bulb, first and second portion were all normal.  Scope was then
withdrawn and the procedure terminated.  The patient tolerated the procedure
well.
 
IMPRESSION:
1.  Grade B erosive esophagitis.
2.  Gastritis with gastric erosions.
3.  Small hiatal hernia.
4.  Otherwise, normal upper endoscopy.
 
RECOMMENDATIONS:
 
 
 
[address]   [address]                     PROCEDURE REPORT              
_______________________________________________________________________________
 
Name:       EMILIE PARKER                   Room #:         448-P       [facility] [place]  
.R.#:      [id number]                       Account #:      [id number]  
Admission:  11/23/20    Attend Phys:    Prabhu Benedict MD     
Discharge:  12/03/20    Date of Birth:  [birth year]  
                                                          Report #: [id number]
                                                                    [id number]   
_______________________________________________________________________________
1.  Await biopsy results.
2.  Continue PPI therapy.
3.  We will proceed with flexible sigmoidoscopy next today.
 
Thank you for allowing me to participate in his care.
 
 
 
 
 
 
 
 
 
 
 
 
 
 
 
 
 
 
 
 
 
 
 
 
 
 
 
 
 
 
 
 
 
 
 
 
 
 
 
  <ELECTRONICALLY SIGNED>
   By: Mor Piña MD   
  12/04/20     1156
D: 12/02/20 1607                           _____________________________________
T: 12/03/20 0023                           Mor Piña, MD     /nt

## 2020-12-07 NOTE — PATH
[facility]
[address]
[place]   [address]                     PATHOLOGY RPT PROCEDURE       
_______________________________________________________________________________
 
Name:       ELENAEMILIE                   Room #:         448-P       [facility] [place]  
M.R.#:      [id number]     Account #:      [id number]  
Admission:  11/23/20    Date of Birth:  [birth year]  
Discharge:  12/03/20                                    Report #:    [id number]
                                                        Path Case #: [id number]
_______________________________________________________________________________
 
[facility] Accession Number: [id number]
.                                                                01
Material submitted:                                        .
stomach - BIOPSY OF GASTRITIS R/O H. PYLORI
.                                                                01
Clinician provided ICD-10:
K92.0
N17.0
.                                                                01
Clinical history:                                          .
COFFEE GROUND EMESIS; GASTRITIS, ABNORMAL CT, ESOPHAGITIS
.                                                                02
**********************************************************************
Diagnosis:
Gastric mucosa, gastritis, rule out H. pylori, endoscopic biopsy:
- Mild reactive gastropathy.
- Negative for intestinal metaplasia or atrophy.
- Negative for Helicobacter pylori (properly controlled
immunohistochemical stain performed).
(IUV:pit 12/04/2020)
QTP  12/04/2020  1525 Local
**********************************************************************
.                                                                02
Electronically signed:                                     .
Kathi Nascimento MD, Pathologist
NPI- [id number]
.                                                                01
Gross description:                                         .
The specimen is received in formalin, labeled "Emilie Seth BX of
gastritis" and consists of 4 fragments of pink-tan tissue measuring
between 0.3 x 0.2 cm and 0.5 x 0.3 cm which are entirely submitted in A1.
(SDY; 12/3/2020)
SYU/SYU  12/03/2020  1612 Local
.                                                                02
Pathologist provided ICD-10:
K31.9
.                                                                02
CPT                                                        .
284887, E81182
Specimen Comment: A courtesy copy of this report has been sent to [phone]
[phone]
Specimen Comment: [phone], [phone]
Specimen Comment: Report sent to ,DR PRECIADO / DR GUILLEN
***Performed at:  01
   [facility], [place]  [id number]
   MD Torres Osorio MD Phone:  [phone]
 
 
[place] [address]                     PATHOLOGY RPT PROCEDURE       
_______________________________________________________________________________
 
Name:       EMILIE SETH                   Room #:         448-P       [facility] [place]  
M.R.#:      [id number]     Account #:      [id number]  
Admission:  11/23/20    Date of Birth:  [birth year]  
Discharge:  12/03/20                                    Report #:    [id number]
                                                        Path Case #: [id number]
_______________________________________________________________________________
***Performed at:  02
   [facility]  [id number]
   MD Kathi Nascimento MD Phone:  [phone]

## 2022-01-03 ENCOUNTER — HOSPITAL ENCOUNTER (OUTPATIENT)
Dept: HOSPITAL 35 - CATH | Age: 72
Discharge: HOME | End: 2022-01-03
Attending: RADIOLOGY
Payer: COMMERCIAL

## 2022-01-03 VITALS — SYSTOLIC BLOOD PRESSURE: 148 MMHG | DIASTOLIC BLOOD PRESSURE: 76 MMHG

## 2022-01-03 VITALS — HEIGHT: 70 IN | BODY MASS INDEX: 17.47 KG/M2 | WEIGHT: 122 LBS

## 2022-01-03 VITALS — DIASTOLIC BLOOD PRESSURE: 80 MMHG | SYSTOLIC BLOOD PRESSURE: 144 MMHG

## 2022-01-03 VITALS — DIASTOLIC BLOOD PRESSURE: 85 MMHG | SYSTOLIC BLOOD PRESSURE: 156 MMHG

## 2022-01-03 VITALS — SYSTOLIC BLOOD PRESSURE: 161 MMHG | DIASTOLIC BLOOD PRESSURE: 91 MMHG

## 2022-01-03 VITALS — DIASTOLIC BLOOD PRESSURE: 85 MMHG | SYSTOLIC BLOOD PRESSURE: 165 MMHG

## 2022-01-03 VITALS — DIASTOLIC BLOOD PRESSURE: 83 MMHG | SYSTOLIC BLOOD PRESSURE: 159 MMHG

## 2022-01-03 DIAGNOSIS — I70.1: ICD-10-CM

## 2022-01-03 DIAGNOSIS — I70.238: Primary | ICD-10-CM

## 2022-01-03 DIAGNOSIS — E78.00: ICD-10-CM

## 2022-01-03 DIAGNOSIS — Z88.8: ICD-10-CM

## 2022-01-03 DIAGNOSIS — Z98.890: ICD-10-CM

## 2022-01-03 DIAGNOSIS — I25.10: ICD-10-CM

## 2022-01-03 DIAGNOSIS — N18.30: ICD-10-CM

## 2022-01-03 DIAGNOSIS — I12.9: ICD-10-CM

## 2022-01-03 DIAGNOSIS — E11.22: ICD-10-CM

## 2022-01-03 DIAGNOSIS — F03.90: ICD-10-CM

## 2022-01-03 DIAGNOSIS — Z79.899: ICD-10-CM

## 2022-01-03 DIAGNOSIS — Z86.73: ICD-10-CM

## 2022-01-03 DIAGNOSIS — K21.9: ICD-10-CM

## 2022-01-03 DIAGNOSIS — L97.919: ICD-10-CM

## 2022-01-03 LAB
ANION GAP SERPL CALC-SCNC: 11 MMOL/L (ref 7–16)
BUN SERPL-MCNC: 34 MG/DL (ref 7–18)
CALCIUM SERPL-MCNC: 10 MG/DL (ref 8.5–10.1)
CHLORIDE SERPL-SCNC: 108 MMOL/L (ref 98–107)
CO2 SERPL-SCNC: 28 MMOL/L (ref 21–32)
CREAT SERPL-MCNC: 1.8 MG/DL (ref 0.7–1.3)
GLUCOSE SERPL-MCNC: 192 MG/DL (ref 74–106)
POTASSIUM SERPL-SCNC: 4.3 MMOL/L (ref 3.5–5.1)
SODIUM SERPL-SCNC: 147 MMOL/L (ref 136–145)

## 2022-01-03 PROCEDURE — 62110: CPT

## 2022-01-03 PROCEDURE — 62900: CPT

## 2022-01-03 PROCEDURE — 70005: CPT

## 2022-01-03 NOTE — NUR
MED GIVEN CRUSHED WITH APPLE SAUCE AND SPOON FED TO PT.  PUDDING GIVEN
SHORTLY AFTER PER PT REMOHAMUD.